# Patient Record
Sex: FEMALE | Race: WHITE | NOT HISPANIC OR LATINO | Employment: STUDENT | URBAN - METROPOLITAN AREA
[De-identification: names, ages, dates, MRNs, and addresses within clinical notes are randomized per-mention and may not be internally consistent; named-entity substitution may affect disease eponyms.]

---
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
## 2022-05-19 ENCOUNTER — OFFICE VISIT (OUTPATIENT)
Dept: PLASTIC SURGERY | Facility: CLINIC | Age: 22
End: 2022-05-19
Payer: COMMERCIAL

## 2022-05-19 DIAGNOSIS — J34.89 NOSE SEPTUM PERFORATION: ICD-10-CM

## 2022-05-19 DIAGNOSIS — J34.2 DEVIATED NASAL SEPTUM: Primary | ICD-10-CM

## 2022-05-19 DIAGNOSIS — J34.89 NASAL OBSTRUCTION: ICD-10-CM

## 2022-05-19 NOTE — LETTER
5/19/2022      RE: Giana Tony  [address] [address] [address]       This office note has been dictated.     Service Date: 05/19/2022    REASON FOR VISIT:  Ms. Tony was referred by Dr. Erik Delgado for evaluation of nasal septal perforation and nasal obstruction.      HISTORY OF PRESENT ILLNESS: She notes chronic nasal obstruction with the left being worse than the right.  She tried Flonase between 2016 and 2020 for nasal obstruction and there is a concern that caused the perforation as she was not known to have one before that time.  There is no history of trauma.  She knows the perforation has been there for about two years.  She never had nasal cautery and she does not recall trauma.      SOCIAL HISTORY: She 22-years-old and is a  part-time and a student at [employer].  She will graduate next year.      ALLERGIES:  She is known to be allergic to Keflex and Celexa.  Seasonal allergies.      MEDICATIONS:    1) Venlafaxine 150 mg for depression with good benefit.   2) Birth control.  3) Valtrex 100 mg daily preventatively for oral cold sores.    4) Over-the-counter antihistamines for seasonal allergies.      PAST MEDICAL HISTORY: She had COVID in the past and she lost her sense of smell and it has not returned.      PAST SURGICAL HISTORY:   1) Appendectomy in 2015.    2) Left knee surgery 2012 from a meniscus tear.   3) Tonsillectomy and adenoidectomy in 2005.      HABITS:  She is a nonsmoker.      PHYSICAL EXAMINATION: The patient has normal occlusion.  Relatively good facial symmetry.  Ortiz II skin.  The nose has adequate projection though it is small.  It is straight.  She has good dorsal support.  Turbinates are not hypertropic.  There is some crusted debris along the margin of the perforation.  We talked about using ointment more aggressively.  The perforation is approximately 2 cm in dimension with just under 1 cm remaining dorsal and caudal strut.       RECOMMENDATIONS/PLAN: We talked about perforation repair, the risks and benefits, numbness of the palate, potential use of an oral flap, approximate 90% success rate, and potential infection.  If she would like to proceed with surgery, I would be happy to help her.  She needs to be more vigorous with the ointment.      John Mcgraw M.D.            May 19, 2022      Erik Delgado MD   [facility]   [address]   [address]   [place] [address]       Dear Dr. Delgado,    I had the pleasure of meeting Giana Tony today.  She has a broadened septum posterior to the perforation but as you know, the 2 cm perforation has a fair amount of debris that creates obstruction.  I think the perforation is very compatible with the problems she has had with nasal steroid application.  She has never used intranasal drugs.      I talked with her about repairing this.  If she wants to proceed with surgery, I would be happy to help her.  I will let you know of the outcome if she does follow through with surgery.  I am flattered you suggested she see visit with us.      Sincerely,      John Mcgraw M.D.            May 19, 2022        To Whom it May Concern,    Ms. Giana Tony was referred to by office for evaluation of nasal septal perforation and septal deformity with nasal obstruction.  She has used nasal steroids in the past and it is likely that this in fact created the perforation.  Therefore, topical steroid sprays are a contraindication.  She does not have edematous turbinates.  She does have some inhalant allergies.  She has a septal deformity and a 2 cm septal perforation.  To improve her airway, she needs a septoplasty CPT code (77376) and repair of a nasal septal perforation in order to establish more proper nasal physiology.  We have enclosed photographs showing that her nose is straight from the outside.  There is nothing aesthetic about closing the perforation.      She  would appreciate affirmation that this is a covered service before proceeding with surgery.  If you have any questions, do not hesitate to contact me.    Sincerely,       John Mcgraw M.D.        Enc:  Photographs     John Mcgraw MD        D: 2022   T: 2022   MT: ms    Name:     CHAD ESCOBAR  MRN:      [id number]        Account:      [id number]   :      2000           Service Date: 2022       Document: [id number]

## 2022-05-19 NOTE — LETTER
5/19/2022       RE: Giana Tony  [address] [address] [address]     Dear Colleague,    Thank you for referring your patient, Giana Tony, to the [facility]. Please see a copy of my visit note below.    This office note has been dictated.     Service Date: 05/19/2022    REASON FOR VISIT:  Ms. Tony was referred by Dr. Erik Delgado for evaluation of nasal septal perforation and nasal obstruction.      HISTORY OF PRESENT ILLNESS: She notes chronic nasal obstruction with the left being worse than the right.  She tried Flonase between 2016 and 2020 for nasal obstruction and there is a concern that caused the perforation as she was not known to have one before that time.  There is no history of trauma.  She knows the perforation has been there for about two years.  She never had nasal cautery and she does not recall trauma.      SOCIAL HISTORY: She 22-years-old and is a  part-time and a student at [employer].  She will graduate next year.      ALLERGIES:  She is known to be allergic to Keflex and Celexa.  Seasonal allergies.      MEDICATIONS:    1) Venlafaxine 150 mg for depression with good benefit.   2) Birth control.  3) Valtrex 100 mg daily preventatively for oral cold sores.    4) Over-the-counter antihistamines for seasonal allergies.      PAST MEDICAL HISTORY: She had COVID in the past and she lost her sense of smell and it has not returned.      PAST SURGICAL HISTORY:   1) Appendectomy in 2015.    2) Left knee surgery 2012 from a meniscus tear.   3) Tonsillectomy and adenoidectomy in 2005.      HABITS:  She is a nonsmoker.      PHYSICAL EXAMINATION: The patient has normal occlusion.  Relatively good facial symmetry.  Ortiz II skin.  The nose has adequate projection though it is small.  It is straight.  She has good dorsal support.  Turbinates are not hypertropic.  There is some crusted debris  along the margin of the perforation.  We talked about using ointment more aggressively.  The perforation is approximately 2 cm in dimension with just under 1 cm remaining dorsal and caudal strut.      RECOMMENDATIONS/PLAN: We talked about perforation repair, the risks and benefits, numbness of the palate, potential use of an oral flap, approximate 90% success rate, and potential infection.  If she would like to proceed with surgery, I would be happy to help her.  She needs to be more vigorous with the ointment.      John Mcgraw M.D.            May 19, 2022      Erik Delgado MD   [facility]   [address]   [address]   [place] [address]       Dear Dr. Delgado,    I had the pleasure of meeting Giana Tony today.  She has a broadened septum posterior to the perforation but as you know, the 2 cm perforation has a fair amount of debris that creates obstruction.  I think the perforation is very compatible with the problems she has had with nasal steroid application.  She has never used intranasal drugs.      I talked with her about repairing this.  If she wants to proceed with surgery, I would be happy to help her.  I will let you know of the outcome if she does follow through with surgery.  I am flattered you suggested she see visit with us.      Sincerely,      John Mcgraw M.D.            May 19, 2022        To Whom it May Concern,    Ms. Giana Tony was referred to by office for evaluation of nasal septal perforation and septal deformity with nasal obstruction.  She has used nasal steroids in the past and it is likely that this in fact created the perforation.  Therefore, topical steroid sprays are a contraindication.  She does not have edematous turbinates.  She does have some inhalant allergies.  She has a septal deformity and a 2 cm septal perforation.  To improve her airway, she needs a septoplasty CPT code (14491) and repair of a nasal septal perforation in  order to establish more proper nasal physiology.  We have enclosed photographs showing that her nose is straight from the outside.  There is nothing aesthetic about closing the perforation.      She would appreciate affirmation that this is a covered service before proceeding with surgery.  If you have any questions, do not hesitate to contact me.    Sincerely,       John Mcgarw M.D.        Enc:  Photographs     John Mcgraw MD        D: 2022   T: 2022   MT: ms    Name:     CHAD ESCOBAR  MRN:      [id number]        Account:      [id number]   :      2000           Service Date: 2022       Document: [id number]

## 2022-05-19 NOTE — LETTER
5/19/2022       RE: Giana Tony  [address] [address] [address]     Dear Colleague,    Thank you for referring your patient, Giana Tony, to the [facility]. Please see a copy of my visit note below.    This office note has been dictated.       Again, thank you for allowing me to participate in the care of your patient.      Sincerely,    HOLA BARROS MD

## 2022-05-19 NOTE — LETTER
May 19, 2022  Re: Giana VIVAR Tony  2000    Dear Dr. Delgado,    [facility] I had the pleasure of visiting with Giana today.     Attached you will find a copy of my note. Please feel free to reach out to me with any questions, [phone].     This office note has been dictated.         Your trust in our practice and care is much appreciated.    Sincerely,  HOLA BARROS MD

## 2022-05-21 NOTE — PROGRESS NOTES
Service Date: 05/19/2022    REASON FOR VISIT:  Ms. Tony was referred by Dr. Erik Delgado for evaluation of nasal septal perforation and nasal obstruction.      HISTORY OF PRESENT ILLNESS: She notes chronic nasal obstruction with the left being worse than the right.  She tried Flonase between 2016 and 2020 for nasal obstruction and there is a concern that caused the perforation as she was not known to have one before that time.  There is no history of trauma.  She knows the perforation has been there for about two years.  She never had nasal cautery and she does not recall trauma.      SOCIAL HISTORY: She 22-years-old and is a  part-time and a student at [employer].  She will graduate next year.      ALLERGIES:  She is known to be allergic to Keflex and Celexa.  Seasonal allergies.      MEDICATIONS:    1) Venlafaxine 150 mg for depression with good benefit.   2) Birth control.  3) Valtrex 100 mg daily preventatively for oral cold sores.    4) Over-the-counter antihistamines for seasonal allergies.      PAST MEDICAL HISTORY: She had COVID in the past and she lost her sense of smell and it has not returned.      PAST SURGICAL HISTORY:   1) Appendectomy in 2015.    2) Left knee surgery 2012 from a meniscus tear.   3) Tonsillectomy and adenoidectomy in 2005.      HABITS:  She is a nonsmoker.      PHYSICAL EXAMINATION: The patient has normal occlusion.  Relatively good facial symmetry.  Ortiz II skin.  The nose has adequate projection though it is small.  It is straight.  She has good dorsal support.  Turbinates are not hypertropic.  There is some crusted debris along the margin of the perforation.  We talked about using ointment more aggressively.  The perforation is approximately 2 cm in dimension with just under 1 cm remaining dorsal and caudal strut.      RECOMMENDATIONS/PLAN: We talked about perforation repair, the risks and benefits, numbness of the palate, potential use of an oral  flap, approximate 90% success rate, and potential infection.  If she would like to proceed with surgery, I would be happy to help her.  She needs to be more vigorous with the ointment.      John Mcgraw M.D.            May 19, 2022      Erik Delgado MD   [facility]   [address]   [address]   [place] [address]       Dear Dr. Delgado,    I had the pleasure of meeting Giana Tony today.  She has a broadened septum posterior to the perforation but as you know, the 2 cm perforation has a fair amount of debris that creates obstruction.  I think the perforation is very compatible with the problems she has had with nasal steroid application.  She has never used intranasal drugs.      I talked with her about repairing this.  If she wants to proceed with surgery, I would be happy to help her.  I will let you know of the outcome if she does follow through with surgery.  I am flattered you suggested she see visit with us.      Sincerely,      John Mcgraw M.D.            May 19, 2022        To Whom it May Concern,    Ms. Giana Tony was referred to by office for evaluation of nasal septal perforation and septal deformity with nasal obstruction.  She has used nasal steroids in the past and it is likely that this in fact created the perforation.  Therefore, topical steroid sprays are a contraindication.  She does not have edematous turbinates.  She does have some inhalant allergies.  She has a septal deformity and a 2 cm septal perforation.  To improve her airway, she needs a septoplasty CPT code (67110) and repair of a nasal septal perforation in order to establish more proper nasal physiology.  We have enclosed photographs showing that her nose is straight from the outside.  There is nothing aesthetic about closing the perforation.      She would appreciate affirmation that this is a covered service before proceeding with surgery.  If you have any questions, do not hesitate  to contact me.    Sincerely,       John Mcgraw M.D.        Enc:  Photographs     John Mcgraw MD        D: 2022   T: 2022   MT: ms    Name:     CHAD ESCOBAR  MRN:      [id number]        Account:      [id number]   :      2000           Service Date: 2022       Document: [id number]

## 2022-05-23 DIAGNOSIS — J34.89 NASAL SEPTAL PERFORATION: ICD-10-CM

## 2022-05-23 DIAGNOSIS — J34.2 DEVIATED NASAL SEPTUM: Primary | ICD-10-CM

## 2022-05-23 DIAGNOSIS — J34.89 NASAL OBSTRUCTION: ICD-10-CM

## 2022-06-23 ENCOUNTER — TELEPHONE (OUTPATIENT)
Dept: PLASTIC SURGERY | Facility: CLINIC | Age: 22
End: 2022-06-23

## 2022-06-23 NOTE — TELEPHONE ENCOUNTER
I spoke with the patient's mother to schedule Giana's surgery with Dr. Mcgraw. She is scheduled for 9/7/22 at [facility]. This is an insurance case where a PA was not required per Thalia. I faxed a case request to [facility]. A surgery packet was emailed and mailed. I reviewed the need for a pre-op physical within 30 days and a PCR covid test within 7 days. All of the information was reviewed with the mother. They were instructed to call with any further questions/concerns prior to the surgery.    How Severe Is It?: moderate Is This A New Presentation, Or A Follow-Up?: Follow Up Isotretinoin

## 2022-09-07 ENCOUNTER — TRANSFERRED RECORDS (OUTPATIENT)
Dept: HEALTH INFORMATION MANAGEMENT | Facility: CLINIC | Age: 22
End: 2022-09-07

## 2022-09-14 ENCOUNTER — OFFICE VISIT (OUTPATIENT)
Dept: PLASTIC SURGERY | Facility: CLINIC | Age: 22
End: 2022-09-14
Payer: COMMERCIAL

## 2022-09-14 DIAGNOSIS — Z98.890 POSTOPERATIVE STATE: Primary | ICD-10-CM

## 2022-09-14 NOTE — PROGRESS NOTES
The patient is one week post op from nasal surgery with Dr. Mcgraw. She denied SOB, pain, or fever. Her bruising is minimal and swelling to be as expected. I cleaned under the nose, and removed her bilateral nasal stents without difficulty. She reported a clear nasal passageway. The area was cleaned again. I reviewed post op home care with her and gave her supplies. Giana will follow up in clinic with Dr. Mcgraw in 3-4 weeks. She was instructed to call with any questions.

## 2022-10-13 ENCOUNTER — OFFICE VISIT (OUTPATIENT)
Dept: PLASTIC SURGERY | Facility: CLINIC | Age: 22
End: 2022-10-13
Payer: COMMERCIAL

## 2022-10-13 DIAGNOSIS — Z98.890 POSTOPERATIVE STATE: Primary | ICD-10-CM

## 2022-10-13 NOTE — LETTER
October 13, 2022  Re: Giana VIVAR Tony  2000      Dear Dr. Delgado,    Thank you so much for referring Giana Tony to the [facility]. I had the pleasure of visiting with Giana today.     Attached you will find a copy of my note. Please feel free to reach out to me with any questions, [phone].     This office note has been dictated.         Your trust in our practice and care is much appreciated.    Sincerely,    HOLA BARROS MD

## 2022-10-13 NOTE — LETTER
10/13/2022       RE: Giana Tony  [address] [address] [address]         Dear Colleague,    Thank you for referring your patient, Giana Tony, to the [facility]. Please see a copy of my visit note below.    This office note has been dictated.       Again, thank you for allowing me to participate in the care of your patient.      Sincerely,    HOLA BARROS MD

## 2022-10-14 NOTE — PROGRESS NOTES
Service Date: 10/13/2022    REASON FOR VISIT: Chad is in today.      PHYSICAL EXAMINATION: Things look great.  The perforation is closed.  She still has some resorbable sutures but things are progressing quite nicely.      ASSESSMENT AND PLAN: I think she is doing well enough she can continue her irrigations and ointment and can see me on an as-needed basis.      John Mcgraw M.D.            2022      Erik Delgado MD   [facility]   [address]   [address]   [place] [address]       Dear Dr. Delgado,    I had the pleasure of seeing Chad Tony back today.  Her perforation is nicely closed.  Her breathing has improved.  She is happy, as am I.  Thanks very much for having her see us.    Sincerely,          John Mcgraw MD        D: 10/13/2022   T: 10/14/2022   MT: ms    Name:     CHAD TONY  MRN:      [id number]        Account:      [id number]   :      2000           Service Date: 10/13/2022       Document: [id number]